# Patient Record
Sex: FEMALE | Race: WHITE | NOT HISPANIC OR LATINO | Employment: FULL TIME | URBAN - METROPOLITAN AREA
[De-identification: names, ages, dates, MRNs, and addresses within clinical notes are randomized per-mention and may not be internally consistent; named-entity substitution may affect disease eponyms.]

---

## 2022-01-26 ENCOUNTER — TELEPHONE (OUTPATIENT)
Dept: GASTROENTEROLOGY | Facility: CLINIC | Age: 37
End: 2022-01-26
Payer: COMMERCIAL

## 2022-01-26 NOTE — TELEPHONE ENCOUNTER
----- Message from Vu Condon sent at 1/26/2022 11:09 AM CST -----  Contact: Patient  Please call the patient she need to reschedule her appointment that's scheduled for tomorrow      Call back #  [phone]   ask for Brittny

## 2022-02-28 ENCOUNTER — OFFICE VISIT (OUTPATIENT)
Dept: GASTROENTEROLOGY | Facility: CLINIC | Age: 37
End: 2022-02-28
Payer: COMMERCIAL

## 2022-02-28 VITALS
DIASTOLIC BLOOD PRESSURE: 97 MMHG | HEART RATE: 100 BPM | BODY MASS INDEX: 31.53 KG/M2 | HEIGHT: 61 IN | WEIGHT: 167 LBS | RESPIRATION RATE: 18 BRPM | SYSTOLIC BLOOD PRESSURE: 134 MMHG | OXYGEN SATURATION: 97 %

## 2022-02-28 DIAGNOSIS — K58.9 IRRITABLE BOWEL SYNDROME, UNSPECIFIED TYPE: ICD-10-CM

## 2022-02-28 DIAGNOSIS — Z86.010 PERSONAL HISTORY OF COLONIC POLYPS: ICD-10-CM

## 2022-02-28 DIAGNOSIS — K21.9 GASTROESOPHAGEAL REFLUX DISEASE, UNSPECIFIED WHETHER ESOPHAGITIS PRESENT: Primary | ICD-10-CM

## 2022-02-28 DIAGNOSIS — R14.0 BLOATING: ICD-10-CM

## 2022-02-28 PROCEDURE — 99214 PR OFFICE/OUTPT VISIT, EST, LEVL IV, 30-39 MIN: ICD-10-PCS | Mod: S$GLB,,, | Performed by: INTERNAL MEDICINE

## 2022-02-28 PROCEDURE — 99214 OFFICE O/P EST MOD 30 MIN: CPT | Mod: S$GLB,,, | Performed by: INTERNAL MEDICINE

## 2022-02-28 NOTE — PROGRESS NOTES
Clinic Note    Reason for visit:  The primary encounter diagnosis was Gastroesophageal reflux disease, unspecified whether esophagitis present. Diagnoses of Bloating, Irritable bowel syndrome, unspecified type, and Personal history of colonic polyps were also pertinent to this visit.    PCP: RAH BARNARD       HPI:  This is a 36 y.o. female with a H/O IBS, reflux who is in for F/U.  She reports increased reflux-mostly at night-discussed takiing PPI prior to evening meal.  She has been gaining weight-taking in increased carbs.  She denies CP, SOB, N/V.  She reports bloating-she is on Kambucha.  She has a h/o PUD and reports being under increased stress.       Review of Systems   Constitutional: Positive for diaphoresis and fatigue. Negative for chills, fever and unexpected weight change.   HENT: Negative for mouth sores, nosebleeds, postnasal drip, sore throat, trouble swallowing and voice change.    Eyes: Negative for pain, discharge and eye dryness.   Respiratory: Positive for cough and chest tightness. Negative for apnea, choking, shortness of breath and wheezing.    Cardiovascular: Negative for chest pain, palpitations, leg swelling and claudication.   Gastrointestinal: Positive for abdominal distention, abdominal pain, nausea and reflux. Negative for anal bleeding, blood in stool, change in bowel habit, constipation, diarrhea, rectal pain, vomiting, fecal incontinence and change in bowel habit.   Genitourinary: Positive for flank pain. Negative for bladder incontinence, difficulty urinating, dysuria, frequency and hematuria.   Musculoskeletal: Positive for back pain. Negative for arthralgias, joint swelling and joint deformity.   Integumentary:  Negative for color change, rash and wound.   Allergic/Immunologic: Negative for environmental allergies and food allergies.   Neurological: Positive for headaches. Negative for seizures, facial asymmetry, speech difficulty, weakness and memory loss.   Hematological:  "Negative for adenopathy. Does not bruise/bleed easily.   Psychiatric/Behavioral: Negative for agitation, behavioral problems, confusion, hallucinations and sleep disturbance.      Past Medical History:   Diagnosis Date    ADD (attention deficit disorder)     Anxiety     Arthritis     Gastric ulcer     GERD (gastroesophageal reflux disease)     History of colonic polyps     IBS (irritable bowel syndrome)     Lupus     Migraines     Personal history of colonic polyps     Thyroid disorder      Past Surgical History:   Procedure Laterality Date    bilateral reimplantation of ureters      BLADDER SURGERY       SECTION      x2    CHOLECYSTECTOMY      COLONOSCOPY  2021    ESOPHAGOGASTRODUODENOSCOPY  2021    EXPLORATORY LAPAROTOMY      LITHOTRIPSY      scar tissue removal       History reviewed. No pertinent family history.  Social History     Tobacco Use    Smoking status: Never Smoker    Smokeless tobacco: Never Used   Substance Use Topics    Alcohol use: Never    Drug use: Never     Review of patient's allergies indicates:   Allergen Reactions    Reglan [metoclopramide]     Pcn [penicillins] Rash    Sulfa (sulfonamide antibiotics) Rash      Medication List with Changes/Refills   Current Medications    CLONAZEPAM (KLONOPIN) 1 MG TABLET    Take 1 mg by mouth 2 (two) times daily as needed.    DEXTROAMPHETAMINE-AMPHETAMINE (ADDERALL XR) 20 MG 24 HR CAPSULE    Take 20 mg by mouth every morning.    DICYCLOMINE (BENTYL) 10 MG CAPSULE    Take 10 mg by mouth 4 (four) times daily before meals and nightly.    ESCITALOPRAM OXALATE (LEXAPRO) 20 MG TABLET    Take 10 mg by mouth once daily.    LEVOTHYROXINE (SYNTHROID) 112 MCG TABLET    Take 112 mcg by mouth before breakfast.    PANTOPRAZOLE (PROTONIX) 40 MG TABLET    Take 40 mg by mouth once daily.         Vital Signs:  BP (!) 134/97   Pulse 100   Resp 18   Ht 5' 1" (1.549 m)   Wt 75.8 kg (167 lb)   SpO2 97%   BMI 31.55 kg/m²   Body " mass index is 31.55 kg/m².      Physical Exam  Vitals reviewed.   Constitutional:       General: She is awake. She is not in acute distress.     Appearance: Normal appearance. She is well-developed. She is not ill-appearing, toxic-appearing or diaphoretic.   HENT:      Head: Normocephalic and atraumatic.      Nose: Nose normal.      Mouth/Throat:      Mouth: Mucous membranes are moist.      Pharynx: Oropharynx is clear. No oropharyngeal exudate or posterior oropharyngeal erythema.   Eyes:      General: Lids are normal. Gaze aligned appropriately. No scleral icterus.        Right eye: No discharge.         Left eye: No discharge.      Extraocular Movements: Extraocular movements intact.      Conjunctiva/sclera: Conjunctivae normal.   Neck:      Trachea: Trachea normal.   Cardiovascular:      Rate and Rhythm: Normal rate and regular rhythm.      Pulses:           Radial pulses are 2+ on the right side and 2+ on the left side.   Pulmonary:      Effort: Pulmonary effort is normal. No respiratory distress.      Breath sounds: Normal breath sounds. No stridor. No wheezing or rhonchi.   Chest:      Chest wall: No tenderness.   Abdominal:      General: Abdomen is flat. Bowel sounds are normal. There is no distension.      Palpations: Abdomen is soft. There is no fluid wave, hepatomegaly or mass.      Tenderness: There is no abdominal tenderness. There is no guarding or rebound.      Comments: Obese     Musculoskeletal:         General: No tenderness or deformity.      Cervical back: Full passive range of motion without pain and neck supple. No tenderness.      Right lower leg: No edema.      Left lower leg: No edema.   Lymphadenopathy:      Cervical: No cervical adenopathy.   Skin:     General: Skin is warm and dry.      Capillary Refill: Capillary refill takes less than 2 seconds.      Coloration: Skin is not cyanotic, jaundiced or pale.      Findings: No rash.   Neurological:      General: No focal deficit present.       Mental Status: She is alert and oriented to person, place, and time.      Cranial Nerves: No facial asymmetry.      Motor: No tremor.   Psychiatric:         Attention and Perception: Attention normal.         Mood and Affect: Mood and affect normal.         Speech: Speech normal.         Behavior: Behavior normal. Behavior is cooperative.          Labs: Pertinent labs reviewed.    All of the data above and below has been reviewed by myself and any further interpretations will be reflected in the assessment and plan.   The data includes review of external notes, and independent interpretation of lab results, procedures, x-rays, and imaging reports.      Patient screened for and received weight management and nutritional counseling regarding BMI of less than 18 or greater than 25.  Assessment:  Gastroesophageal reflux disease, unspecified whether esophagitis present    Bloating    Irritable bowel syndrome, unspecified type    Personal history of colonic polyps         Recommendations:  LOW CARB DIET STRESSED.    Take PPI 30min supper.     Patient screened for and received weight management and nutritional counseling regarding BMI of less than 18 or greater than 25.    Risks, benefits, and alternatives of medical management, any associated procedures, and/or treatment discussed with the patient. Patient given opportunity to ask questions and voices understanding. Patient has elected to proceed with the recommended care modalities as discussed.    No follow-ups on file.    Order summary:  No orders of the defined types were placed in this encounter.         Deng Yo MD

## 2022-03-04 ENCOUNTER — PATIENT MESSAGE (OUTPATIENT)
Dept: GASTROENTEROLOGY | Facility: CLINIC | Age: 37
End: 2022-03-04
Payer: COMMERCIAL

## 2022-07-01 ENCOUNTER — TELEPHONE (OUTPATIENT)
Dept: GASTROENTEROLOGY | Facility: CLINIC | Age: 37
End: 2022-07-01
Payer: COMMERCIAL

## 2022-07-01 NOTE — TELEPHONE ENCOUNTER
Left message  ----- Message from Sheree Mohan sent at 7/1/2022 10:03 AM CDT -----  Type:  New Patient Appointment Request    Name of Caller: Pt   When is the first available appointment? N/a   Symptoms: stomach issues   Best Call Back Number: [phone]  Additional Information:  Portal message     Message  Appointment Request From: Adriane Courtney  With Provider: Dr Yo  Preferred Date Range: Any date 7/5/2022 or later  Preferred Times: Monday Afternoon, Tuesday Afternoon, Wednesday Afternoon, Thursday Afternoon, Friday Afternoon  Reason for visit: Stomach issues possible ulcers    Comments:  The pain in my horrible pain in my stomach and possible ulcers

## 2022-07-05 ENCOUNTER — TELEPHONE (OUTPATIENT)
Dept: GASTROENTEROLOGY | Facility: CLINIC | Age: 37
End: 2022-07-05
Payer: COMMERCIAL

## 2022-07-05 NOTE — TELEPHONE ENCOUNTER
Patient is requesting an appointment for foul smelling diarrhea, stomach cramping and pain, in between ribs going to stomach hurts. Being transferred to another  base on 8/10 and would like to be seen and possibly scoped before then. Has a history of bleeding ulcers. Will be unavailable from the 24-28th  ----- Message from Batsheva Claudio sent at 7/5/2022  1:18 PM CDT -----  Type:  Sooner Apoointment Request    Caller is requesting a sooner appointment.  Caller declined first available appointment listed below.  Caller will not accept being placed on the waitlist and is requesting a message be sent to doctor.  Name of Caller: Adriane Courtney  When is the first available appointment? 10/27  Symptoms: severe stomach pain every time she eats and she said she believes she has stomach ulcers again  Would the patient rather a call back or a response via [facility]? Call back   Best Call Back Number: [phone]      Additional Information: Pt stated that she needs to be seen this week. She said [place] and wanted to get this taken care of before doing so.

## 2022-07-06 NOTE — TELEPHONE ENCOUNTER
Patient is complaining of n/v/d, abdominal pain that is relieved with a heating pad. Do you have any recommendations?

## 2022-07-08 DIAGNOSIS — R19.7 DIARRHEA, UNSPECIFIED TYPE: Primary | ICD-10-CM

## 2022-12-05 ENCOUNTER — TELEPHONE (OUTPATIENT)
Dept: GASTROENTEROLOGY | Facility: CLINIC | Age: 37
End: 2022-12-05
Payer: COMMERCIAL

## 2022-12-05 NOTE — TELEPHONE ENCOUNTER
----- Message from Joanie Arenas LPN sent at 12/2/2022 12:55 PM CST -----  Contact: Patient    ----- Message -----  From: Melvi Fine  Sent: 12/2/2022  12:40 PM CST  To: [facility] Staff    Patient  called to consult with nurse or staff regarding some pain she is having in her stomach. She wanted to speak with office about to see if there is a sooner appointment available and would like a call back. She can be reached at [phone]. Thanks/

## 2022-12-05 NOTE — TELEPHONE ENCOUNTER
----- Message from Ute Yen sent at 12/5/2022 10:12 AM CST -----  Contact: Pt  Type:  Patient Returning Call    Who Called: pt   Who Left Message for Patient: nurse   Does the patient know what this is regarding?: an appt and discuss wht's going on with the pt   Would the patient rather a call back or a response via [facility]? phone  Best Call Back Number:  Additional Information:

## 2022-12-05 NOTE — TELEPHONE ENCOUNTER
Returned patients call. Patient states about 3 weeks ago she bent over and got a really sharp pain in her abdomen, since then fluctuates between constipation and diarrhea, lots of bloating and pain. Has appointment  02/15/2023 would like to be seen sooner. Please advise.

## 2022-12-07 NOTE — TELEPHONE ENCOUNTER
Called patient back with no answer. Left message that we could schedule patient for end of March and put on wait list for cancellation in the mean time.

## 2023-02-14 ENCOUNTER — TELEPHONE (OUTPATIENT)
Dept: GASTROENTEROLOGY | Facility: CLINIC | Age: 38
End: 2023-02-14

## 2023-02-14 NOTE — TELEPHONE ENCOUNTER
----- Message from Leonarda Martin sent at 2/14/2023 11:10 AM CST -----  Regarding: appt access  Contact: pt  Pt states that someone had called her and changed her appt to Friday. Pt states that she is unable to make the appt for tomorrow. Please call back at [phone]//thank you acc